# Patient Record
Sex: FEMALE | Race: WHITE | NOT HISPANIC OR LATINO | URBAN - METROPOLITAN AREA
[De-identification: names, ages, dates, MRNs, and addresses within clinical notes are randomized per-mention and may not be internally consistent; named-entity substitution may affect disease eponyms.]

---

## 2018-11-21 ENCOUNTER — EMERGENCY (EMERGENCY)
Facility: HOSPITAL | Age: 28
LOS: 1 days | Discharge: HOME | End: 2018-11-21
Attending: EMERGENCY MEDICINE | Admitting: EMERGENCY MEDICINE
Payer: SELF-PAY

## 2018-11-21 VITALS
WEIGHT: 179.9 LBS | TEMPERATURE: 97 F | SYSTOLIC BLOOD PRESSURE: 138 MMHG | OXYGEN SATURATION: 99 % | HEART RATE: 94 BPM | DIASTOLIC BLOOD PRESSURE: 80 MMHG | RESPIRATION RATE: 15 BRPM

## 2018-11-21 DIAGNOSIS — R00.2 PALPITATIONS: ICD-10-CM

## 2018-11-21 DIAGNOSIS — R42 DIZZINESS AND GIDDINESS: ICD-10-CM

## 2018-11-21 PROCEDURE — 93010 ELECTROCARDIOGRAM REPORT: CPT

## 2018-11-21 PROCEDURE — 99284 EMERGENCY DEPT VISIT MOD MDM: CPT | Mod: 25

## 2018-11-21 NOTE — ED ADULT NURSE NOTE - NSIMPLEMENTINTERV_GEN_ALL_ED
Implemented All Universal Safety Interventions:  [place] to call system. Call bell, personal items and telephone within reach. Instruct patient to call for assistance. Room bathroom lighting operational. Non-slip footwear when patient is off stretcher. Physically safe environment: no spills, clutter or unnecessary equipment. Stretcher in lowest position, wheels locked, appropriate side rails in place.

## 2018-11-21 NOTE — ED PROVIDER NOTE - MEDICAL DECISION MAKING DETAILS
patient refusing work up in the ED, refusing labs, or fluids. notes she is not longer symptomatic. given dc paperwork and advised to see cardiology outpatient.

## 2018-11-21 NOTE — ED PROVIDER NOTE - OBJECTIVE STATEMENT
Patient with pmhx of PFO, clinically stable, no medications or interventions, presents s./p development of palpitations after drinking a monster energy drink. notes shortly after drinking, she became lightheaded, dizzy, and palpitations, and tingling to L side. patient was at work when symptoms occurred, and notes now they are resolved. notes diffuse tremors and shaking, and coworkers called ems. no seizures, denies cp or sob at this time. patient notes she would like to be discharged, and is refusing to do labs or work up in the ED. notes she was made to come to the ED for evaluation. denies drugs or etoh.

## 2018-11-21 NOTE — ED PROVIDER NOTE - CARE PROVIDER_API CALL
Cliff Bundy), Cardiovascular Disease; Internal Medicine  [address] [facility]  [address] [place] [address]  Phone: [phone]  Fax: [phone]

## 2018-11-21 NOTE — ED ADULT TRIAGE NOTE - CHIEF COMPLAINT QUOTE
Pt brought in by EMS after pt her heart racing, chest pain, shaky, dizzy and fatigued after drinking a new [employer] drink. Pt denies chest pain at this time. LMP last week.

## 2021-04-13 NOTE — ED ADULT NURSE NOTE - NS ED NOTE  TALK SOMEONE YN
Called pt (she has alzheimers and demantia). Spoke to daughter and notified her that pt needs annual visit for future refills. She will call to schedule the appt at a later time.    Meredith Hickman RN     No

## 2021-09-27 NOTE — ED PROVIDER NOTE - CARE PROVIDERS DIRECT ADDRESSES
Lara Clarke, MS, RDN (pager #[phone]) ,[email or web address]
